# Patient Record
Sex: FEMALE | Race: BLACK OR AFRICAN AMERICAN | NOT HISPANIC OR LATINO | Employment: FULL TIME | URBAN - METROPOLITAN AREA
[De-identification: names, ages, dates, MRNs, and addresses within clinical notes are randomized per-mention and may not be internally consistent; named-entity substitution may affect disease eponyms.]

---

## 2019-11-07 LAB
ABORH_EXT (HISTORICAL CONVERSION): NORMAL
ANTIBODY_EXT (HISTORICAL CONVERSION): NEGATIVE
HBSAG_EXT (HISTORICAL CONVERSION): NEGATIVE
HGB_EXT (HISTORICAL CONVERSION): 11.9
HIV_EXT: NEGATIVE
RPR - HISTORICAL: NORMAL
RUBELLA_EXT (HISTORICAL CONVERSION): NORMAL

## 2020-02-24 ENCOUNTER — HOSPITAL ENCOUNTER (OUTPATIENT)
Dept: MEDSURG UNIT | Facility: CLINIC | Age: 30
Discharge: HOME | End: 2020-02-24
Attending: OBSTETRICS & GYNECOLOGY | Admitting: OBSTETRICS & GYNECOLOGY

## 2020-02-24 LAB
ALBUMIN UR-MCNC: ABNORMAL MG/DL
APPEARANCE UR: ABNORMAL
BACTERIA #/AREA URNS HPF: ABNORMAL HPF
BILIRUB UR QL STRIP: NEGATIVE
CLUE CELLS: NORMAL
COLOR UR AUTO: YELLOW
FIBRONECTIN FETAL VAG QL: NEGATIVE
GLUCOSE UR STRIP-MCNC: NEGATIVE MG/DL
HGB UR QL STRIP: NEGATIVE
KETONES UR STRIP-MCNC: NEGATIVE MG/DL
LEUKOCYTE ESTERASE UR QL STRIP: ABNORMAL
MUCOUS THREADS #/AREA URNS LPF: ABNORMAL LPF
NITRATE UR QL: NEGATIVE
PH UR STRIP: 6 [PH] (ref 4.5–8)
RBC #/AREA URNS AUTO: ABNORMAL HPF
RUPTURE OF FETAL MEMBRANES BY ROM PLUS: NEGATIVE
SP GR UR STRIP: 1.01 (ref 1–1.03)
SQUAMOUS #/AREA URNS AUTO: >100 LPF
TRICHOMONAS, WET PREP: NORMAL
UROBILINOGEN UR STRIP-ACNC: ABNORMAL
WBC #/AREA URNS AUTO: ABNORMAL HPF
YEAST, WET PREP: NORMAL

## 2020-02-24 ASSESSMENT — MIFFLIN-ST. JEOR: SCORE: 1574.98

## 2020-02-25 LAB
ALLERGIC TO PENICILLIN: NORMAL
BACTERIA SPEC CULT: NO GROWTH
GP B STREP DNA SPEC QL NAA+PROBE: NEGATIVE

## 2020-04-01 ENCOUNTER — HOSPITAL ENCOUNTER (OUTPATIENT)
Dept: MEDSURG UNIT | Facility: CLINIC | Age: 30
Discharge: HOME | End: 2020-04-01
Attending: OBSTETRICS & GYNECOLOGY | Admitting: OBSTETRICS & GYNECOLOGY

## 2020-04-01 LAB
ALBUMIN UR-MCNC: NEGATIVE MG/DL
APPEARANCE UR: CLEAR
BILIRUB UR QL STRIP: NEGATIVE
COLOR UR AUTO: NORMAL
GLUCOSE UR STRIP-MCNC: NEGATIVE MG/DL
HGB UR QL STRIP: NEGATIVE
KETONES UR STRIP-MCNC: NEGATIVE MG/DL
LEUKOCYTE ESTERASE UR QL STRIP: NEGATIVE
NITRATE UR QL: NEGATIVE
PH UR STRIP: 8 [PH] (ref 4.5–8)
SP GR UR STRIP: 1.01 (ref 1–1.03)
UROBILINOGEN UR STRIP-ACNC: NORMAL

## 2020-04-06 ENCOUNTER — HOSPITAL ENCOUNTER (OUTPATIENT)
Dept: OBGYN | Facility: HOSPITAL | Age: 30
Discharge: HOME | End: 2020-04-06
Attending: OBSTETRICS & GYNECOLOGY | Admitting: OBSTETRICS & GYNECOLOGY

## 2020-04-06 DIAGNOSIS — N76.0 BACTERIAL VAGINITIS: ICD-10-CM

## 2020-04-06 DIAGNOSIS — B96.89 BACTERIAL VAGINITIS: ICD-10-CM

## 2020-04-06 LAB
ALBUMIN UR-MCNC: ABNORMAL MG/DL
AMORPH CRY #/AREA URNS HPF: ABNORMAL /[HPF]
APPEARANCE UR: ABNORMAL
BACTERIA #/AREA URNS HPF: ABNORMAL HPF
BILIRUB UR QL STRIP: NEGATIVE
CLUE CELLS: ABNORMAL
COLOR UR AUTO: YELLOW
GLUCOSE UR STRIP-MCNC: NEGATIVE MG/DL
HGB UR QL STRIP: NEGATIVE
KETONES UR STRIP-MCNC: NEGATIVE MG/DL
LEUKOCYTE ESTERASE UR QL STRIP: ABNORMAL
MUCOUS THREADS #/AREA URNS LPF: ABNORMAL LPF
NITRATE UR QL: NEGATIVE
PH UR STRIP: 7 [PH] (ref 4.5–8)
RBC #/AREA URNS AUTO: ABNORMAL HPF
RUPTURE OF FETAL MEMBRANES BY ROM PLUS: NEGATIVE
SP GR UR STRIP: 1.02 (ref 1–1.03)
SQUAMOUS #/AREA URNS AUTO: >100 LPF
TRICHOMONAS, WET PREP: ABNORMAL
UROBILINOGEN UR STRIP-ACNC: ABNORMAL
WBC #/AREA URNS AUTO: ABNORMAL HPF
YEAST, WET PREP: ABNORMAL

## 2020-04-06 ASSESSMENT — MIFFLIN-ST. JEOR: SCORE: 1561.37

## 2020-04-07 LAB — BACTERIA SPEC CULT: NO GROWTH

## 2020-04-20 ENCOUNTER — HOSPITAL ENCOUNTER (OUTPATIENT)
Dept: MEDSURG UNIT | Facility: CLINIC | Age: 30
Discharge: HOME | End: 2020-04-20
Attending: OBSTETRICS & GYNECOLOGY | Admitting: OBSTETRICS & GYNECOLOGY

## 2020-04-20 LAB
CLUE CELLS: ABNORMAL
TRICHOMONAS, WET PREP: ABNORMAL
YEAST, WET PREP: ABNORMAL

## 2020-04-28 ENCOUNTER — HOSPITAL ENCOUNTER (OUTPATIENT)
Dept: MEDSURG UNIT | Facility: CLINIC | Age: 30
Discharge: HOME | End: 2020-04-28
Attending: OBSTETRICS & GYNECOLOGY | Admitting: OBSTETRICS & GYNECOLOGY

## 2020-04-28 LAB
CLUE CELLS: ABNORMAL
TRICHOMONAS, WET PREP: ABNORMAL
YEAST, WET PREP: ABNORMAL

## 2020-04-28 ASSESSMENT — MIFFLIN-ST. JEOR: SCORE: 1622.61

## 2020-04-29 ENCOUNTER — HOSPITAL ENCOUNTER (OUTPATIENT)
Dept: MEDSURG UNIT | Facility: CLINIC | Age: 30
Discharge: HOME | End: 2020-04-29
Attending: OBSTETRICS & GYNECOLOGY | Admitting: OBSTETRICS & GYNECOLOGY

## 2020-04-29 LAB
AMPHETAMINES UR QL SCN: NORMAL
BARBITURATES UR QL: NORMAL
BENZODIAZ UR QL: NORMAL
CANNABINOIDS UR QL SCN: NORMAL
COCAINE UR QL: NORMAL
CREAT UR-MCNC: 139.4 MG/DL
METHADONE UR QL SCN: NORMAL
OPIATES UR QL SCN: NORMAL
OXYCODONE UR QL: NORMAL
PCP UR QL SCN: NORMAL
RUPTURE OF FETAL MEMBRANES BY ROM PLUS: NEGATIVE

## 2020-04-29 ASSESSMENT — MIFFLIN-ST. JEOR: SCORE: 1622.61

## 2020-05-04 ENCOUNTER — HOSPITAL ENCOUNTER (OUTPATIENT)
Dept: OBGYN | Facility: HOSPITAL | Age: 30
Discharge: HOME | End: 2020-05-04
Attending: OBSTETRICS & GYNECOLOGY | Admitting: OBSTETRICS & GYNECOLOGY

## 2020-05-04 ENCOUNTER — COMMUNICATION - HEALTHEAST (OUTPATIENT)
Dept: OBGYN | Facility: HOSPITAL | Age: 30
End: 2020-05-04

## 2020-05-04 DIAGNOSIS — Z3A.38 38 WEEKS GESTATION OF PREGNANCY: ICD-10-CM

## 2020-05-04 LAB
SARS-COV-2 PCR COMMENT: NORMAL
SARS-COV-2 RNA SPEC QL NAA+PROBE: NEGATIVE
SARS-COV-2 VIRUS SPECIMEN SOURCE: NORMAL

## 2020-05-04 ASSESSMENT — MIFFLIN-ST. JEOR: SCORE: 1622.61

## 2020-05-08 ENCOUNTER — AMBULATORY - HEALTHEAST (OUTPATIENT)
Dept: OBGYN | Facility: CLINIC | Age: 30
End: 2020-05-08

## 2020-05-08 DIAGNOSIS — Z33.1 PREGNANT STATE, INCIDENTAL: ICD-10-CM

## 2020-05-09 ENCOUNTER — COMMUNICATION - HEALTHEAST (OUTPATIENT)
Dept: FAMILY MEDICINE | Facility: CLINIC | Age: 30
End: 2020-05-09

## 2020-05-09 ENCOUNTER — OFFICE VISIT - HEALTHEAST (OUTPATIENT)
Dept: FAMILY MEDICINE | Facility: CLINIC | Age: 30
End: 2020-05-09

## 2020-05-09 DIAGNOSIS — Z33.1 PREGNANT STATE, INCIDENTAL: ICD-10-CM

## 2020-05-12 ENCOUNTER — ANESTHESIA - HEALTHEAST (OUTPATIENT)
Dept: OBGYN | Facility: CLINIC | Age: 30
End: 2020-05-12

## 2020-05-13 ENCOUNTER — HOME CARE/HOSPICE - HEALTHEAST (OUTPATIENT)
Dept: HOME HEALTH SERVICES | Facility: HOME HEALTH | Age: 30
End: 2020-05-13

## 2020-05-15 ENCOUNTER — HOME CARE/HOSPICE - HEALTHEAST (OUTPATIENT)
Dept: HOME HEALTH SERVICES | Facility: HOME HEALTH | Age: 30
End: 2020-05-15

## 2020-10-26 ENCOUNTER — COMMUNICATION - HEALTHEAST (OUTPATIENT)
Dept: FAMILY MEDICINE | Facility: CLINIC | Age: 30
End: 2020-10-26

## 2020-10-26 ENCOUNTER — OFFICE VISIT - HEALTHEAST (OUTPATIENT)
Dept: FAMILY MEDICINE | Facility: CLINIC | Age: 30
End: 2020-10-26

## 2020-10-26 DIAGNOSIS — Z01.818 PREOP EXAMINATION: ICD-10-CM

## 2020-10-26 DIAGNOSIS — H50.9 STRABISMUS: ICD-10-CM

## 2020-10-26 LAB
ERYTHROCYTE [DISTWIDTH] IN BLOOD BY AUTOMATED COUNT: 12.9 % (ref 11–14.5)
HCT VFR BLD AUTO: 43.7 % (ref 35–47)
HGB BLD-MCNC: 14.2 G/DL (ref 12–16)
MCH RBC QN AUTO: 28.3 PG (ref 27–34)
MCHC RBC AUTO-ENTMCNC: 32.4 G/DL (ref 32–36)
MCV RBC AUTO: 87 FL (ref 80–100)
PLATELET # BLD AUTO: 363 THOU/UL (ref 140–440)
PMV BLD AUTO: 7.4 FL (ref 7–10)
RBC # BLD AUTO: 5.02 MILL/UL (ref 3.8–5.4)
WBC: 11 THOU/UL (ref 4–11)

## 2021-01-15 ENCOUNTER — HEALTH MAINTENANCE LETTER (OUTPATIENT)
Age: 31
End: 2021-01-15

## 2021-05-27 VITALS
TEMPERATURE: 98.3 F | RESPIRATION RATE: 16 BRPM | SYSTOLIC BLOOD PRESSURE: 100 MMHG | HEART RATE: 84 BPM | OXYGEN SATURATION: 98 % | DIASTOLIC BLOOD PRESSURE: 70 MMHG

## 2021-06-04 VITALS — BODY MASS INDEX: 33.49 KG/M2 | HEIGHT: 65 IN | WEIGHT: 201 LBS

## 2021-06-04 VITALS — WEIGHT: 201 LBS | HEIGHT: 65 IN | BODY MASS INDEX: 33.49 KG/M2

## 2021-06-04 VITALS — HEIGHT: 65 IN | BODY MASS INDEX: 33.49 KG/M2 | WEIGHT: 201 LBS

## 2021-06-04 VITALS — WEIGHT: 191 LBS | HEIGHT: 64 IN | BODY MASS INDEX: 32.61 KG/M2

## 2021-06-04 VITALS — BODY MASS INDEX: 33.12 KG/M2 | HEIGHT: 64 IN | WEIGHT: 194 LBS

## 2021-06-05 VITALS
SYSTOLIC BLOOD PRESSURE: 106 MMHG | DIASTOLIC BLOOD PRESSURE: 74 MMHG | BODY MASS INDEX: 31.7 KG/M2 | WEIGHT: 190.5 LBS | HEART RATE: 91 BPM

## 2021-06-06 NOTE — PROGRESS NOTES
Labs WNL, urine sent for culture. OB US WNL. Pt is constipated, MD attempted digital extraction of stool with small amount of results. Order to d/c pt home, to follow up in clinic this week. Pt states understanding of plan of care.

## 2021-06-07 NOTE — PROGRESS NOTES
Pt seen in triage today for rule out rupture.  ROM + negative.  Cervical exam by Dr. Fierro is unchanged from yesterdays exam.  Pt educated on hydration and reminded to  her Rx to treat her bacterial vaginosis.

## 2021-06-07 NOTE — PROGRESS NOTES
"Chief Complaint   Patient presents with     Consult     Abdominal pain       Initial /57 (BP Location: Right arm, Patient Position: Sitting, Cuff Size: Adult Regular)  Pulse 67  Ht 5' 0.2\" (152.9 cm)  Wt 135 lb 5.8 oz (61.4 kg)  BMI 26.26 kg/m2 Estimated body mass index is 26.26 kg/(m^2) as calculated from the following:    Height as of this encounter: 5' 0.2\" (152.9 cm).    Weight as of this encounter: 135 lb 5.8 oz (61.4 kg).  Medication Reconciliation: complete    " Pt arrives to OB unit to rule out labor. She reports back pain and cramping today. Denies leaking of fluid or any bleeding at this time. She states she has had bacterial vaginosis twice this month and has finished treatment. Wet prep obtained along with cervical exam.

## 2021-06-07 NOTE — PROGRESS NOTES
"OB Triage    Subjective: Castro Kurtz is a  29 y.o. female at 37w2d with a prenatal history significant for tobacco use who presents to OB triage with leakage of fluid. Patient was here overnight in triage for worsening abdominal cramping, wet prep was positive for BV and patient treated with flagyl. Cervical exam performed as well. Took one dose while here prior to discharge and has not picked up prescription. She reports around 0100hrs last night, she went to the bathroom and noticed a brownish colored vaginal discharge. Also, reports she has had increased intensity of contractions and they occur irregularly throughout the day. She states she had five contractions, around 45 seconds each in a 1-2 hour period and then they went away for awhile. She had a phone visit with Dr. Duckworth this morning who counseled patient to come in to be seen. She describes her pain in her lower abdomen, worse on the left lower quadrant. She has been wearing a \"belly band.\" She has only been drinking about two bottles of water a day. Also reports a couple episodes of diarrhea over the past week or two.Denies headache, vision changes, RUQ pain, extremity swelling, nausea/vomiting, vaginal bleeding.     Estimated Date of Delivery: 20 No LMP recorded. Patient is pregnant.  OB provider: Hollis Duckworth MD  OB History        4    Para   2    Term   2            AB   1    Living   2       SAB        TAB        Ectopic        Multiple        Live Births                     Objective:   Blood pressure 113/75, pulse 91, temperature 98  F (36.7  C), temperature source Oral, resp. rate 16, height 5' 5\" (1.651 m), weight 201 lb (91.2 kg).  General:   alert, appears stated age and cooperative   Skin:   normal   Lungs:   clear to auscultation bilaterally   Heart:   regular rate and rhythm, S1, S2 normal, no murmur, click, rub or gallop   Extremities: Warm, dry and well perfused. No edema.   Neuro: Reflexes 2+ and " symmetric.    Abdomen: fundus soft, nontender 37 weeks size and FHT present   Membranes intact   [place]:  Occasional, irregular   FHT: Baseline: 145 bpm, Variability: Moderate (6 - 25 bpm), Accelerations: Present (reactive NST) and Decelerations: Absent. Category I Tracing   Cervix: Examined by Dr. Fierro    Dilation: Fingertip   Effacement: 25%   Station:  -3   Consistency: medium   Position: middle     Laboratory Studies:   Results for orders placed or performed during the hospital encounter of 20   Collect and send wet prep vaginal specimen as indicated by prenatal history and/or patient complaints    Specimen: Vaginal; Genital   Result Value Ref Range    Yeast Result No yeast seen No yeast seen    Trichomonas No Trichomonas seen No Trichomonas seen    Clue Cells, Wet Prep Clue cells seen (!) No Clue cells seen        ASSESSMENT AND PLAN: Castro Kurtz is a  29 y.o. female with a prenatal history significant for tobacco use who presented to [facility] at 37w2d on 2020 with an episode of brownish vaginal discharge shortly after returning home from hospital. Recently diagnosed with bacterial vaginosis last night and cervical exam performed. Having intermittent, worsening lower abdominal cramping. Has only taken one dose of flagyl since last night. ROM negative test. Reactive NST. Cervical exam unchanged. Likely, experienced vaginal discharge from cervical exam last night. Also, may still have increased abdominal cramping/contractions from bacterial vaginosis versus round ligament pain versus dehydration. Counseled on importance to stay hydrated and take full course of antibiotics. Counseled on signs and symptoms to return/call including: vaginal bleeding, increased frequency/intensity of contractions, etc.   1. Not in labor.  2.  flagyl prescription and take two times a day for 7 days.  3. Aim for at least 64 oz of water per day or more  4. Follow up in clinic in the  next few days.     Patient discussed with attending physician, Dr. Juanis Norman, who agrees with the plan.      Luzma Fierro MD PGY2 4/29/2020  [facility]

## 2021-06-07 NOTE — PROGRESS NOTES
"Pt reports that after fluid bolus and IV zofran that she is feeling \"much better\" and no longer is having abdominal cramping. Says that she would feel comfortable discharging to home. Dr Murillo updated on pt status and lab results. Will give one dose of flagyl now to treat BV and  will call in Rx to her pharmacy for the remaining doses. Pt given written and verbal instructions and discharged to home  "

## 2021-06-07 NOTE — PROGRESS NOTES
Pt given DC instructions. She verbalized understanding and had no further questions or concerns. She had her belongings in her possession and ambulated upon dc from unit.

## 2021-06-07 NOTE — PROGRESS NOTES
"Pt arrived with c/o lower uterine pressure and cramping that radiates into her sides. She reports that she has had \"dampness\" in her underwear since Saturday and some frequency and urgency with voiding, denies further complications. ROM+, wet prep and UA specs obtained. FHT's category one some uterine irritability noted on the monitor.  BV present other tests negative. Dr Virgen notified of the above, RX sent with pt.  "

## 2021-06-07 NOTE — PROGRESS NOTES
Pt arrives to OB unit with c/o nausea, diarrhea that began yesterday. She reports abdominal cramping this evening. Reports that she was not able to eat or drink much today due to the nausea.

## 2021-06-07 NOTE — PROGRESS NOTES
Pt arrived through the ED for labor that started around 0300. She appears uncomfortable, Monitors applied, SVE 1 cm. Covid test collected and sent

## 2021-06-07 NOTE — PROGRESS NOTES
Dr Murillo notified of pt status and lab results. ORders to treat with flagyl and send home with Rx for flagyl to treat BV diagnosis. Pt given discharge instructions in written and verbal form. Pt instructed to touch base with clinic tomorrow.

## 2021-06-07 NOTE — PROGRESS NOTES
"Pt presents to unit with c/o \"I tripped and fell yesterday and I just woke up to some sharp pain,\" pt points to her low, left side of abdomen/groin. Pt also states that she has not felt the baby move since yesterday morning. Pt denies any vaginal bleeding or discharge. Abdomen is soft and nontender to palpation. Pt placed on EFM. VS taken. FM+ with leopolds. Pt also reports increased urinary frequency and retention.   Dr Chaidez called and updated, went to bedside to evaluate pt. Plan to send UA and EFM while awaiting results. Pt verbalized agreement with plan.  "

## 2021-06-08 NOTE — ANESTHESIA PREPROCEDURE EVALUATION
Anesthesia Evaluation      Patient summary reviewed     Airway    Pulmonary    (+) a smoker                         Cardiovascular    Neuro/Psych      Endo/Other    (+) obesity, pregnant     GI/Hepatic/Renal            Dental                         Anesthesia Plan  Planned anesthetic: epidural    ASA 2

## 2021-06-08 NOTE — ANESTHESIA PROCEDURE NOTES
Epidural Block    Patient location during procedure: OB  Time Called: 5/12/2020 11:30 AM  Reason for Block:labor epidural  Staffing:  Performing  Anesthesiologist: Stuart Boateng MD  Preanesthetic Checklist  Completed: patient identified, risks, benefits, and alternatives discussed, timeout performed, consent obtained, at patient's request, airway assessed, oxygen available, suction available, emergency drugs available and hand hygiene performed  Procedure  Patient position: sitting  Prep: ChloraPrep  Patient monitoring: continuous pulse oximetry, heart rate and blood pressure  Approach: midline  Location: L2-L3  Injection technique: ASAD saline  Number of Attempts:1  Needle  Needle type: [employer]   Needle gauge: 18 G     Catheter in Space: 4  Assessment  Sensory level:  No complications      Additional Notes:  After negative aspirate for heme/CSF, a test dose was given using three ml 1.5% xylocaine with epinephrine 1:200K.  Test dose was negative.  Catheter was taped securely.  Epidural catheter was then dosed with 8ml 0.2% ropivacaine.  Epidural infusion initiated by OB RN as per orders.  Procedure was well tolerated.

## 2021-06-08 NOTE — ANESTHESIA POSTPROCEDURE EVALUATION
Patient: Castro Kurtz  * No procedures listed *  Anesthesia type: epidural    Patient location: Labor and Delivery  Last vitals: No vitals data found for the desired time range.    Post vital signs: stable  Level of consciousness: awake and responds to simple questions  Post-anesthesia pain: pain controlled  Post-anesthesia nausea and vomiting: no  Pulmonary: unassisted, return to baseline  Cardiovascular: stable and blood pressure at baseline  Hydration: adequate  Anesthetic events: no    QCDR Measures:  ASA# 11 - Tiffany-op Cardiac Arrest: ASA11B - Patient did NOT experience unanticipated cardiac arrest  ASA# 12 - Tiffany-op Mortality Rate: ASA12B - Patient did NOT die  ASA# 13 - PACU Re-Intubation Rate: NA - No ETT / LMA used for case  ASA# 10 - Composite Anes Safety: ASA10A - No serious adverse event    Additional Notes:

## 2021-06-12 NOTE — PROGRESS NOTES
Preoperative Exam    Scheduled Procedure: L eye surgery, strabismus  Surgery Date:  10/28/2020  Surgery Location: [facility]     Surgeon:  Unknown     Assessment/Plan:     1. Preop examination  For left eye strabismus surgery.  - HM2(CBC w/o Differential)    2. Strabismus  Left eye affected with decreased visual acuity.  - HM2(CBC w/o Differential)    PLAN:  1.  CBC reviewed, results completely normal see below.  2.  Patient is otherwise cleared for surgery.        Surgical Procedure Risk: Low (reported cardiac risk generally < 1%)  Have you had prior anesthesia?: No  Have you or any family members had a previous anesthesia reaction:  No  Do you or any family members have a history of a clotting or bleeding disorder?: No  Cardiac Risk Assessment: no increased risk for major cardiac complications    APPROVAL GIVEN to proceed with proposed procedure, without further diagnostic evaluation    Please Note:  Patient has no prior surgical history.    Functional Status: Independent  Patient plans to recover at home with family.     Subjective:      Castro Kurtz is a 30 y.o. female who presents for a preoperative consultation.  Patient comes in for preoperative clearance for left eye surgery specifically she has a lazy eye or strabismus, this has been a longstanding issue, she has minimal visual acuity of the left eye fairly good on the right, it is hoped that she gets some improvement in vision of the left eye after surgery.    The patient is otherwise very healthy no other change in her health status she is not on any regular medications.  She does have a Mirena IUD in, this was placed 6 weeks postpartum she did have a vaginal delivery 6 months ago.    Patient has no underlying health issues, no history of any asthma or breathing problems no recent illnesses.  She is a very light smoker several cigarettes daily otherwise has good health habits.    I reviewed the patient's allergies medications active  medical problems past medical history past surgical history family history and social history.    All other systems reviewed and are negative, other than those listed in the HPI.    Pertinent History  Do you have difficulty breathing or chest pain after walking up a flight of stairs: No  History of obstructive sleep apnea: No  Steroid use in the last 6 months: No  Frequent Aspirin/NSAID use: No  Prior Blood Transfusion: No  Prior Blood Transfusion Reaction: No  If for some reason prior to, during or after the procedure, if it is medically indicated, would you be willing to have a blood transfusion?:  There is no transfusion refusal.    No current outpatient medications on file.     No current facility-administered medications for this visit.         No Known Allergies    Patient Active Problem List   Diagnosis     Papanicolaou smear of cervix with atypical squamous cells of undetermined significance (ASC-US)     Uses birth control       History reviewed. No pertinent past medical history.    Past Surgical History:   Procedure Laterality Date     INTRAUTERINE DEVICE INSERTION      Mirena       Social History     Socioeconomic History     Marital status: Single     Spouse name: Not on file     Number of children: 3     Years of education: Not on file     Highest education level: Not on file   Occupational History     Occupation: PCA   Social Needs     Financial resource strain: Not on file     Food insecurity     Worry: Not on file     Inability: Not on file     Transportation needs     Medical: Not on file     Non-medical: Not on file   Tobacco Use     Smoking status: Current Every Day Smoker     Packs/day: 0.25     Years: 5.00     Pack years: 1.25     Smokeless tobacco: Never Used     Tobacco comment: 2-3 cigarettes a day   Substance and Sexual Activity     Alcohol use: Yes     Comment: rare     Drug use: Never     Sexual activity: Not Currently     Partners: Male     Birth control/protection: None   Lifestyle      Physical activity     Days per week: Not on file     Minutes per session: Not on file     Stress: Not on file   Relationships     Social connections     Talks on phone: Not on file     Gets together: Not on file     Attends [employer] service: Not on file     Active member of club or organization: Not on file     Attends meetings of clubs or organizations: Not on file     Relationship status: Not on file     Intimate partner violence     Fear of current or ex partner: Not on file     Emotionally abused: Not on file     Physically abused: Not on file     Forced sexual activity: Not on file   Other Topics Concern     Not on file   Social History Narrative     Not on file       Patient Care Team:  Hollis Duckworth MD as PCP - General (Obstetrics and Gynecology)          Objective:     Vitals:    10/26/20 1134   BP: 106/74   Pulse: 91   Weight: 190 lb 8 oz (86.4 kg)         Physical Exam:  Physical Exam   Constitutional: She is oriented to person, place, and time. She appears well-developed and well-nourished.   HENT:   Head: Normocephalic and atraumatic.   Right Ear: External ear normal.   Eyes: Pupils are equal, round, and reactive to light. Conjunctivae and EOM are normal.   Cardiovascular: Normal rate, regular rhythm and normal heart sounds.   Pulmonary/Chest: Effort normal and breath sounds normal.   Musculoskeletal: Normal range of motion.   Neurological: She is alert and oriented to person, place, and time.   Skin: Skin is warm and dry.   Psychiatric: She has a normal mood and affect. Her behavior is normal. Judgment and thought content normal.         There are no Patient Instructions on file for this visit.    EKG: Not performed    Labs:  Recent Results (from the past 24 hour(s))   HM2(CBC w/o Differential)    Collection Time: 10/26/20 12:02 PM   Result Value Ref Range    WBC 11.0 4.0 - 11.0 thou/uL    RBC 5.02 3.80 - 5.40 mill/uL    Hemoglobin 14.2 12.0 - 16.0 g/dL    Hematocrit 43.7 35.0 - 47.0 %    MCV 87 80 -  100 fL    MCH 28.3 27.0 - 34.0 pg    MCHC 32.4 32.0 - 36.0 g/dL    RDW 12.9 11.0 - 14.5 %    Platelets 363 140 - 440 thou/uL    MPV 7.4 7.0 - 10.0 fL       Immunization History   Administered Date(s) Administered     DTP 06/30/1989, 09/22/1989, 10/25/1994, 01/17/1995, 03/23/1995     HIB (HBOC) 01/17/1995, 03/23/1995     Hep B, Peds or Adolescent 01/17/1995, 03/23/1995, 09/11/2001, 10/09/2001     MMR 10/25/1994, 09/11/2001     OPV,Trivalent,Historic(2151-6912 only) 06/30/1989, 10/25/1994, 01/17/1995, 03/23/1995     POLIO, Unspecified 06/30/1989, 10/25/1994, 01/17/1995, 03/23/1995     Tdap 03/10/2008           Electronically signed by Alec Prince MD 10/26/20 11:29 AM

## 2021-06-20 NOTE — LETTER
Letter by Elzbieta Hassan RN at      Author: Elzbieta Hassan RN Service: -- Author Type: --    Filed:  Encounter Date: 5/4/2020 Status: (Other)       5/4/2020        Casrto Kurtz  [address] [place] [address]    This letter provides a written record that you were tested for COVID-19 on 5/4/20.     Your result was negative.    This means that we didnt find the virus that causes COVID-19 in your sample. A test may show negative when you do actually have the virus. This can happen when the virus is in the early stages of infection, before you feel illness symptoms.    Even if you dont have symptoms, they may still appear. For safety, its very important to follow these rules.    Keep yourself away from others (self-isolation):      Stay home. Dont go to work, school or anywhere else.     Stay away from others in your home. No hugging, kissing or shaking hands.    Dont let anyone visit.    Cover your mouth and nose with a mask, tissue or wash cloth to avoid spreading germs.    Stay in self-isolation until you meet ALL of the guidelines below:    1. You have had no fever for at least 72 hours (that is 3 full days of no fever without the use of medicine that reduces fevers), AND  2. other symptoms (such as cough, shortness of breath) have gotten better, AND  3. at least 7 days have passed since your symptoms first appeared.        For questions regarding this letter or your Negative COVID-19 result, call [phone] between 8A to 6:30P (M-F) and 10A to 6:30P (weekends).

## 2021-06-20 NOTE — LETTER
Letter by Elzbieta Hassan RN at      Author: Elzbieta Hassan RN Service: -- Author Type: --    Filed:  Encounter Date: 5/9/2020 Status: (Other)       5/9/2020        Castro Kurtz  [address] [place] [address]    This letter provides a written record that you were tested for COVID-19 on 5/9/20.     Your result was negative.    This means that we didnt find the virus that causes COVID-19 in your sample. A test may show negative when you do actually have the virus. This can happen when the virus is in the early stages of infection, before you feel illness symptoms.    Even if you dont have symptoms, they may still appear. For safety, its very important to follow these rules.    Keep yourself away from others (self-isolation):      Stay home. Dont go to work, school or anywhere else.     Stay in your own room (and use your own bathroom), if you can.    Stay away from others in your home. No hugging, kissing or shaking hands. No visitors.    Clean high touch surfaces often (doorknobs, counters, handles, etc.). Use a household cleaning spray or wipes.    Cover your mouth and nose with a mask, tissue or washcloth to avoid spreading germs.    Wash your hands and face often with soap and water.    Stay in self-isolation until you meet ALL of the guidelines below:    1. You have had no fever for at least 72 hours (that is 3 full days of no fever without the use of medicine that reduces fevers), AND  2. other symptoms (such as cough, shortness of breath) have gotten better, AND  3. at least 10 days have passed since your symptoms first appeared.    Going back to work  Check with your employer for any guidelines to follow for going back to work.    Employers: This document serves as formal notice that your employee tested negative for COVID-19, as of the testing date shown above.    For questions regarding this letter or your Negative COVID-19 result, call [phone] between 8A to 6:30P (M-F) and 10A to 6:30P  (weekends).

## 2021-06-21 NOTE — LETTER
Letter by Alec Prince MD at      Author: Alec Prince MD Service: -- Author Type: --    Filed:  Encounter Date: 10/26/2020 Status: (Other)         Csatro Kurtz  [address] [place] [address]             October 26, 2020         Dear Ms. Kurtz,    Below are the results from your recent visit: Blood counts are normal, no anemia.    Resulted Orders   HM2(CBC w/o Differential)   Result Value Ref Range    WBC 11.0 4.0 - 11.0 thou/uL    RBC 5.02 3.80 - 5.40 mill/uL    Hemoglobin 14.2 12.0 - 16.0 g/dL    Hematocrit 43.7 35.0 - 47.0 %    MCV 87 80 - 100 fL    MCH 28.3 27.0 - 34.0 pg    MCHC 32.4 32.0 - 36.0 g/dL    RDW 12.9 11.0 - 14.5 %    Platelets 363 140 - 440 thou/uL    MPV 7.4 7.0 - 10.0 fL            Please call with questions or contact us using [employer].    Sincerely,        Electronically signed by Alec Prince MD

## 2021-07-14 PROBLEM — Z34.90 PREGNANT: Status: RESOLVED | Noted: 2020-05-12 | Resolved: 2020-10-26

## 2021-09-11 ENCOUNTER — HEALTH MAINTENANCE LETTER (OUTPATIENT)
Age: 31
End: 2021-09-11

## 2021-10-01 ENCOUNTER — HOSPITAL ENCOUNTER (EMERGENCY)
Facility: CLINIC | Age: 31
Discharge: LEFT BEFORE DISCHARGE | End: 2021-10-01
Admitting: EMERGENCY MEDICINE

## 2021-10-01 VITALS
OXYGEN SATURATION: 99 % | DIASTOLIC BLOOD PRESSURE: 69 MMHG | TEMPERATURE: 98.9 F | RESPIRATION RATE: 20 BRPM | SYSTOLIC BLOOD PRESSURE: 109 MMHG | BODY MASS INDEX: 32.62 KG/M2 | WEIGHT: 196 LBS | HEART RATE: 122 BPM

## 2021-10-01 LAB
ATRIAL RATE - MUSE: 108 BPM
DIASTOLIC BLOOD PRESSURE - MUSE: NORMAL MMHG
INTERPRETATION ECG - MUSE: NORMAL
P AXIS - MUSE: 62 DEGREES
PR INTERVAL - MUSE: 140 MS
QRS DURATION - MUSE: 70 MS
QT - MUSE: 316 MS
QTC - MUSE: 423 MS
R AXIS - MUSE: 65 DEGREES
SYSTOLIC BLOOD PRESSURE - MUSE: NORMAL MMHG
T AXIS - MUSE: 51 DEGREES
VENTRICULAR RATE- MUSE: 108 BPM

## 2021-10-01 PROCEDURE — 93005 ELECTROCARDIOGRAM TRACING: CPT | Performed by: EMERGENCY MEDICINE

## 2021-10-01 PROCEDURE — 999N000104 HC STATISTIC NO CHARGE

## 2021-10-01 NOTE — ED TRIAGE NOTES
Liposuction done on 9/23 in [place].  Now c/o abd pain nonradiating, cp nonradiating. Denies SOB. Denies fevers. Tylenol at 0800. Ibuprofen last night. No N/V

## 2022-02-26 ENCOUNTER — HEALTH MAINTENANCE LETTER (OUTPATIENT)
Age: 32
End: 2022-02-26

## 2022-10-29 ENCOUNTER — HEALTH MAINTENANCE LETTER (OUTPATIENT)
Age: 32
End: 2022-10-29

## 2023-04-08 ENCOUNTER — HEALTH MAINTENANCE LETTER (OUTPATIENT)
Age: 33
End: 2023-04-08

## 2023-09-03 ENCOUNTER — APPOINTMENT (OUTPATIENT)
Dept: CT IMAGING | Facility: CLINIC | Age: 33
End: 2023-09-03
Attending: SOCIAL WORKER
Payer: COMMERCIAL

## 2023-09-03 ENCOUNTER — HOSPITAL ENCOUNTER (EMERGENCY)
Facility: CLINIC | Age: 33
Discharge: HOME | End: 2023-09-03
Attending: SOCIAL WORKER | Admitting: SOCIAL WORKER
Payer: COMMERCIAL

## 2023-09-03 VITALS
OXYGEN SATURATION: 99 % | DIASTOLIC BLOOD PRESSURE: 65 MMHG | WEIGHT: 205 LBS | SYSTOLIC BLOOD PRESSURE: 99 MMHG | TEMPERATURE: 98.7 F | HEIGHT: 65 IN | HEART RATE: 68 BPM | BODY MASS INDEX: 34.16 KG/M2 | RESPIRATION RATE: 18 BRPM

## 2023-09-03 DIAGNOSIS — J02.0 STREP PHARYNGITIS: ICD-10-CM

## 2023-09-03 DIAGNOSIS — J03.90 TONSILLITIS: ICD-10-CM

## 2023-09-03 DIAGNOSIS — J36 ABSCESS, INTRATONSILLAR: ICD-10-CM

## 2023-09-03 LAB
ANION GAP SERPL CALCULATED.3IONS-SCNC: 11 MMOL/L (ref 7–15)
BASOPHILS # BLD AUTO: 0 10E3/UL (ref 0–0.2)
BASOPHILS NFR BLD AUTO: 0 %
BUN SERPL-MCNC: 6.9 MG/DL (ref 6–20)
CALCIUM SERPL-MCNC: 9.4 MG/DL (ref 8.6–10)
CHLORIDE SERPL-SCNC: 102 MMOL/L (ref 98–107)
CREAT SERPL-MCNC: 0.68 MG/DL (ref 0.51–0.95)
DEPRECATED HCO3 PLAS-SCNC: 25 MMOL/L (ref 22–29)
EOSINOPHIL # BLD AUTO: 0 10E3/UL (ref 0–0.7)
EOSINOPHIL NFR BLD AUTO: 0 %
ERYTHROCYTE [DISTWIDTH] IN BLOOD BY AUTOMATED COUNT: 13.2 % (ref 10–15)
GFR SERPL CREATININE-BSD FRML MDRD: >90 ML/MIN/1.73M2
GLUCOSE SERPL-MCNC: 98 MG/DL (ref 70–99)
GROUP A STREP BY PCR: DETECTED
HCT VFR BLD AUTO: 39.1 % (ref 35–47)
HGB BLD-MCNC: 13.1 G/DL (ref 11.7–15.7)
IMM GRANULOCYTES # BLD: 0 10E3/UL
IMM GRANULOCYTES NFR BLD: 0 %
LYMPHOCYTES # BLD AUTO: 2.1 10E3/UL (ref 0.8–5.3)
LYMPHOCYTES NFR BLD AUTO: 13 %
MCH RBC QN AUTO: 29.2 PG (ref 26.5–33)
MCHC RBC AUTO-ENTMCNC: 33.5 G/DL (ref 31.5–36.5)
MCV RBC AUTO: 87 FL (ref 78–100)
MONOCYTES # BLD AUTO: 1.1 10E3/UL (ref 0–1.3)
MONOCYTES NFR BLD AUTO: 7 %
NEUTROPHILS # BLD AUTO: 12.3 10E3/UL (ref 1.6–8.3)
NEUTROPHILS NFR BLD AUTO: 80 %
NRBC # BLD AUTO: 0 10E3/UL
NRBC BLD AUTO-RTO: 0 /100
PLATELET # BLD AUTO: 314 10E3/UL (ref 150–450)
POTASSIUM SERPL-SCNC: 3.2 MMOL/L (ref 3.4–5.3)
RBC # BLD AUTO: 4.49 10E6/UL (ref 3.8–5.2)
SODIUM SERPL-SCNC: 138 MMOL/L (ref 136–145)
WBC # BLD AUTO: 15.5 10E3/UL (ref 4–11)

## 2023-09-03 PROCEDURE — 96375 TX/PRO/DX INJ NEW DRUG ADDON: CPT

## 2023-09-03 PROCEDURE — 96365 THER/PROPH/DIAG IV INF INIT: CPT | Mod: 59

## 2023-09-03 PROCEDURE — 85025 COMPLETE CBC W/AUTO DIFF WBC: CPT | Performed by: SOCIAL WORKER

## 2023-09-03 PROCEDURE — 99285 EMERGENCY DEPT VISIT HI MDM: CPT | Mod: 25

## 2023-09-03 PROCEDURE — 36415 COLL VENOUS BLD VENIPUNCTURE: CPT | Performed by: SOCIAL WORKER

## 2023-09-03 PROCEDURE — 96361 HYDRATE IV INFUSION ADD-ON: CPT

## 2023-09-03 PROCEDURE — 258N000003 HC RX IP 258 OP 636: Performed by: SOCIAL WORKER

## 2023-09-03 PROCEDURE — 87651 STREP A DNA AMP PROBE: CPT | Performed by: SOCIAL WORKER

## 2023-09-03 PROCEDURE — 250N000009 HC RX 250: Performed by: SOCIAL WORKER

## 2023-09-03 PROCEDURE — 250N000011 HC RX IP 250 OP 636: Mod: JZ | Performed by: SOCIAL WORKER

## 2023-09-03 PROCEDURE — 80048 BASIC METABOLIC PNL TOTAL CA: CPT | Performed by: SOCIAL WORKER

## 2023-09-03 PROCEDURE — 250N000011 HC RX IP 250 OP 636: Performed by: SOCIAL WORKER

## 2023-09-03 PROCEDURE — 70491 CT SOFT TISSUE NECK W/DYE: CPT

## 2023-09-03 PROCEDURE — 250N000013 HC RX MED GY IP 250 OP 250 PS 637: Performed by: SOCIAL WORKER

## 2023-09-03 RX ORDER — DEXAMETHASONE SODIUM PHOSPHATE 10 MG/ML
10 INJECTION, SOLUTION INTRAMUSCULAR; INTRAVENOUS ONCE
Status: COMPLETED | OUTPATIENT
Start: 2023-09-03 | End: 2023-09-03

## 2023-09-03 RX ORDER — IBUPROFEN 600 MG/1
600 TABLET, FILM COATED ORAL ONCE
Status: COMPLETED | OUTPATIENT
Start: 2023-09-03 | End: 2023-09-03

## 2023-09-03 RX ORDER — AMPICILLIN AND SULBACTAM 2; 1 G/1; G/1
3 INJECTION, POWDER, FOR SOLUTION INTRAMUSCULAR; INTRAVENOUS ONCE
Status: COMPLETED | OUTPATIENT
Start: 2023-09-03 | End: 2023-09-03

## 2023-09-03 RX ORDER — IOPAMIDOL 755 MG/ML
500 INJECTION, SOLUTION INTRAVASCULAR ONCE
Status: COMPLETED | OUTPATIENT
Start: 2023-09-03 | End: 2023-09-03

## 2023-09-03 RX ORDER — KETOROLAC TROMETHAMINE 15 MG/ML
15 INJECTION, SOLUTION INTRAMUSCULAR; INTRAVENOUS ONCE
Status: COMPLETED | OUTPATIENT
Start: 2023-09-03 | End: 2023-09-03

## 2023-09-03 RX ADMIN — IOPAMIDOL 90 ML: 755 INJECTION, SOLUTION INTRAVENOUS at 03:37

## 2023-09-03 RX ADMIN — DEXAMETHASONE SODIUM PHOSPHATE 10 MG: 10 INJECTION, SOLUTION INTRAMUSCULAR; INTRAVENOUS at 02:55

## 2023-09-03 RX ADMIN — IBUPROFEN 600 MG: 600 TABLET, FILM COATED ORAL at 02:17

## 2023-09-03 RX ADMIN — AMPICILLIN SODIUM AND SULBACTAM SODIUM 3 G: 2; 1 INJECTION, POWDER, FOR SOLUTION INTRAMUSCULAR; INTRAVENOUS at 04:59

## 2023-09-03 RX ADMIN — SODIUM CHLORIDE 65 ML: 9 INJECTION, SOLUTION INTRAVENOUS at 03:37

## 2023-09-03 RX ADMIN — SODIUM CHLORIDE 1000 ML: 9 INJECTION, SOLUTION INTRAVENOUS at 02:55

## 2023-09-03 ASSESSMENT — ACTIVITIES OF DAILY LIVING (ADL)
ADLS_ACUITY_SCORE: 35
ADLS_ACUITY_SCORE: 35

## 2023-09-03 NOTE — DISCHARGE INSTRUCTIONS
You were seen in the emergency department for painful sore throat.  You were found to have strep.  The CT scan here did not show any signs of an abscess that could be drained.  I gave you dose of antibiotics here.  Your pain improved after receiving a dose of steroids and ibuprofen.    At home, please take ibuprofen and Tylenol for your pain.  You can also try gargling with salt water as this may help as well.    I am sending a prescription for a antibiotic called Augmentin to [facility].  Please take this as directed.  Please call your primary care doctor on Tuesday to schedule a follow-up checkup.    If you have high fevers, if you start having severe pain that cannot be managed at home, if you feel like the swelling is worsening, you have difficulty breathing, you are unable to swallow your antibiotics, or other concerning symptoms please come back to the emergency department.

## 2023-09-03 NOTE — ED PROVIDER NOTES
"  History     Chief Complaint:  Facial Swelling       The history is provided by the patient.      Castro Kurtz is a 32 year old female who presents with a sore throat and facial swelling. She explains that for the past 48 hours she has had a sore throat. With her sore throat she notes that it has been hard to swallow. This morning she woke up and noticed that she had some right sided neck swelling. She has no energy, chills, and the sweats as well. When she turns her neck she has some pain on her right side. Denies any diarrhea, emesis, shortness of breath, fevers, and chest pain. She does note that her son had an ear infection and a cold a week ago and daughter had strep 2 weeks ago.     Independent Historian:   None - Patient Only    Review of External Notes:   No relevant external notes    Medications:    Cyclobenzaprine   Gabapentin   Sennosides-docusate sodium     Past Medical History:    H/O alcohol abuse   Tobacco use   Abnormal discharge   Insomnia   Migraine with aura   Depression   Anxiety   Chlamydia   Echogenic focus of heart  Cellulitis     Past Surgical History:    [place] teeth extraction   Insert intrauterine device      Physical Exam   Patient Vitals for the past 24 hrs:   BP Temp Temp src Pulse Resp SpO2 Height Weight   09/03/23 0215 109/80 -- -- -- -- -- -- --   09/03/23 0213 -- 98.6  F (37  C) Temporal 106 20 98 % 1.651 m (5' 5\") 93 kg (205 lb)        Physical Exam  General: Alert, interactive appropriately, overall nontoxic appearing   HEENT: Conjunctivae clear, no scleral icterus, posterior pharynx with bilateral exudates, right-sided tonsillar swelling, no uvular deviation or swelling, swelling over the right side of the neck greater than the left side of the neck no crepitus, floor of the mouth soft  Neuro: Alert, no focal deficit, gait intact   CV: Regular rate and rhythm, no murmurs, pulses equal   Respiratory: No signs of respiratory distress, lungs clear to auscultation bilaterally "   Abdomen: Soft, nontender nondistended   MSK: No rashes lesions skin intact, no crepitus to palpation over the chest or neck    Emergency Department Course     Imaging:  Soft tissue neck CT w contrast   Final Result   IMPRESSION:    1.  Findings suggestive of pharyngitis and tonsillitis, with a small abscess in the right palatine tonsil measuring 6 x 7 x 3 mm in diameter.   2.  Marked cervical adenopathy, without evidence of suppuration.   3.  The airway is patent.               Report per radiology    Laboratory:  Labs Ordered and Resulted from Time of ED Arrival to Time of ED Departure   BASIC METABOLIC PANEL - Abnormal       Result Value    Sodium 138      Potassium 3.2 (*)     Chloride 102      Carbon Dioxide (CO2) 25      Anion Gap 11      Urea Nitrogen 6.9      Creatinine 0.68      Calcium 9.4      Glucose 98      GFR Estimate >90     CBC WITH PLATELETS AND DIFFERENTIAL - Abnormal    WBC Count 15.5 (*)     RBC Count 4.49      Hemoglobin 13.1      Hematocrit 39.1      MCV 87      MCH 29.2      MCHC 33.5      RDW 13.2      Platelet Count 314      % Neutrophils 80      % Lymphocytes 13      % Monocytes 7      % Eosinophils 0      % Basophils 0      % Immature Granulocytes 0      NRBCs per 100 WBC 0      Absolute Neutrophils 12.3 (*)     Absolute Lymphocytes 2.1      Absolute Monocytes 1.1      Absolute Eosinophils 0.0      Absolute Basophils 0.0      Absolute Immature Granulocytes 0.0      Absolute NRBCs 0.0     GROUP A STREPTOCOCCUS PCR THROAT SWAB - Abnormal    Group A strep by PCR Detected (*)       Emergency Department Course & Assessments:       Interventions:  Medications   ibuprofen (ADVIL/MOTRIN) tablet 600 mg (600 mg Oral $Given 9/3/23 0217)   dexAMETHasone PF (DECADRON) injection 10 mg (10 mg Intravenous $Given 9/3/23 0255)   ketorolac (TORADOL) injection 15 mg (0 mg Intravenous Return to Cabinet 9/3/23 0255)   0.9% sodium chloride BOLUS (0 mLs Intravenous Stopped 9/3/23 0421)   iopamidol (ISOVUE-370)  solution 500 mL (90 mLs Intravenous $Given 9/3/23 0337)   Sodium Chloride for CT Scan Flush Use (65 mLs Intravenous $Given 9/3/23 0337)   ampicillin-sulbactam (UNASYN) 3 g vial to attach to  mL bag (0 g Intravenous Stopped 9/3/23 0530)      Independent Interpretation (X-rays, CTs, rhythm strip):  None    Assessments/Consultations/Discussion of Management or Tests:  ED Course as of 23 0620   Sun Sep 03, 2023   0239 I obtained history and examined the patient as noted above.     0425 Reassessed, patient's pain has improved    0507 Soft tissue neck CT w contrast  IMPRESSION:   1.  Findings suggestive of pharyngitis and tonsillitis, with a small abscess in the right palatine tonsil measuring 6 x 7 x 3 mm in diameter.  2.  Marked cervical adenopathy, without evidence of suppuration.  3.  The airway is patent.        0559 After repeat vitals, reassessed patient.  She states that her blood pressure always does run low and her mother says as well, she has no symptoms of lightheadedness dizziness.       Social Determinants of Health affecting care:   None    Disposition:  The patient was discharged to home.     Impression & Plan    CMS Diagnoses: None    Medical Decision Makin-year-old female without significant past medical history who presented to the emergency department with sore throat and right-sided neck swelling.  She was afebrile here denies any fevers at home.  She was overall nontoxic and stable appearing, she is tolerating oral secretions, she was not hypoxic.  She denied any difficulty breathing. She did not have any chest tenderness, no shortness of breath, no crepitus to palpation over the mediastinal area.      The CT scan obtained here showed that she had pharyngitis as well as tonsillitis and a small intra tonsillar abscess that would not be amenable to drainage given its size of less than 2 cm.  No signs of retropharyngeal abscess, Som's angina.  Floor the mouth is soft.  I gave her a  dose of steroid here and Toradol and her pain improved.  Also gave a dose of IV antibiotics here and patient was sent to with a prescription for Augmentin to be picked up.  Of note, patient's blood pressure proportion left was systolic 99/65, however he is completely asymptomatic and per review of the chart she normally is in the low 100s to 110s and she states that she and her mother both have low blood pressures I do not think that this is evident of systemic infection causing hypotension.    I discussed return precautions with the patient. She was comfortable with the plan and discharged in stable condition.     Diagnosis:    ICD-10-CM    1. Strep pharyngitis  J02.0       2. Tonsillitis  J03.90       3. Abscess, intratonsillar  J36            Discharge Medications:  Discharge Medication List as of 9/3/2023  5:58 AM        START taking these medications    Details   amoxicillin-clavulanate (AUGMENTIN) 875-125 MG tablet Take 1 tablet by mouth 2 times daily for 7 days, Disp-14 tablet, R-0, E-Prescribe                Scribe Disclosure:  IRj, am serving as a scribe at 2:25 AM on 9/3/2023 to document services personally performed by Maritza Rivera MD based on my observations and the provider's statements to me.   9/3/2023   Maritza Rivera MD Wu Klasek, Connie, MD  09/03/23 0691

## 2023-10-12 ENCOUNTER — OFFICE VISIT (OUTPATIENT)
Dept: FAMILY MEDICINE | Facility: CLINIC | Age: 33
End: 2023-10-12
Payer: COMMERCIAL

## 2023-10-12 VITALS
TEMPERATURE: 98.2 F | WEIGHT: 205 LBS | BODY MASS INDEX: 34.16 KG/M2 | HEIGHT: 65 IN | DIASTOLIC BLOOD PRESSURE: 72 MMHG | RESPIRATION RATE: 16 BRPM | SYSTOLIC BLOOD PRESSURE: 103 MMHG | HEART RATE: 100 BPM | OXYGEN SATURATION: 98 %

## 2023-10-12 DIAGNOSIS — B96.89 BACTERIAL VAGINOSIS: Primary | ICD-10-CM

## 2023-10-12 DIAGNOSIS — Z97.5 IUD (INTRAUTERINE DEVICE) IN PLACE: ICD-10-CM

## 2023-10-12 DIAGNOSIS — N76.0 BACTERIAL VAGINOSIS: Primary | ICD-10-CM

## 2023-10-12 DIAGNOSIS — N89.8 VAGINAL DISCHARGE: ICD-10-CM

## 2023-10-12 LAB
ALBUMIN UR-MCNC: NEGATIVE MG/DL
APPEARANCE UR: CLEAR
BACTERIA #/AREA URNS HPF: ABNORMAL /HPF
BILIRUB UR QL STRIP: NEGATIVE
CLUE CELLS: PRESENT
COLOR UR AUTO: YELLOW
GLUCOSE UR STRIP-MCNC: NEGATIVE MG/DL
HGB UR QL STRIP: ABNORMAL
KETONES UR STRIP-MCNC: NEGATIVE MG/DL
LEUKOCYTE ESTERASE UR QL STRIP: NEGATIVE
NITRATE UR QL: NEGATIVE
PH UR STRIP: 6 [PH] (ref 5–7)
RBC #/AREA URNS AUTO: ABNORMAL /HPF
SP GR UR STRIP: >=1.03 (ref 1–1.03)
SQUAMOUS #/AREA URNS AUTO: ABNORMAL /LPF
TRICHOMONAS, WET PREP: ABNORMAL
UROBILINOGEN UR STRIP-ACNC: 1 E.U./DL
WBC #/AREA URNS AUTO: ABNORMAL /HPF
WBC'S/HIGH POWER FIELD, WET PREP: ABNORMAL
YEAST, WET PREP: ABNORMAL

## 2023-10-12 PROCEDURE — 99213 OFFICE O/P EST LOW 20 MIN: CPT | Performed by: NURSE PRACTITIONER

## 2023-10-12 PROCEDURE — 87210 SMEAR WET MOUNT SALINE/INK: CPT | Performed by: NURSE PRACTITIONER

## 2023-10-12 PROCEDURE — 81001 URINALYSIS AUTO W/SCOPE: CPT | Performed by: NURSE PRACTITIONER

## 2023-10-12 RX ORDER — METRONIDAZOLE 500 MG/1
500 TABLET ORAL 2 TIMES DAILY
Qty: 14 TABLET | Refills: 0 | Status: SHIPPED | OUTPATIENT
Start: 2023-10-12 | End: 2023-10-19

## 2023-10-12 ASSESSMENT — ENCOUNTER SYMPTOMS
CHILLS: 0
DYSURIA: 0
ABDOMINAL PAIN: 0
FEVER: 0

## 2023-10-12 ASSESSMENT — PAIN SCALES - GENERAL: PAINLEVEL: NO PAIN (0)

## 2023-10-12 NOTE — Clinical Note
Please give pt her results (see pt instructions)   and make sure she understands to follow up for pap and further discussion.

## 2023-10-12 NOTE — PATIENT INSTRUCTIONS
Take the pills twice per day for 7 days.  No alcohol while on this medication. ollow up with me for your pap and further discussion about ongoing vaginal discharge.

## 2023-10-12 NOTE — PROGRESS NOTES
Assessment & Plan     ICD-10-CM    1. Bacterial vaginosis  N76.0 metroNIDAZOLE (FLAGYL) 500 MG tablet    B96.89       2. IUD (intrauterine device) in place  Z97.5       3. Vaginal discharge  N89.8 UA with Microscopic reflex to Culture     Wet prep - lab collect     Wet prep - lab collect     UA Microscopic with Reflex to Culture     metroNIDAZOLE (FLAGYL) 500 MG tablet            bacterial vaginosis and will treat orally.  Follow up for pap and further discussion of recurrent BV      Patient Instructions   Take the pills twice per day for 7 days.  No alcohol while on this medication. ollow up with me for your pap and further discussion about ongoing vaginal discharge.    Return in about 4 weeks (around 11/9/2023).      REGINE Ramon CNP  [facility] PADMINI Erazo is a 32 year old, presenting for the following health issues:  Establish Care and Vaginal Problem        10/12/2023     3:59 PM   Additional Questions   Roomed by Martita Colby VF       Est care.    Vaginal discharge has been going on about a year. Seems to be an excessive amount of clear dischargeand it is very constant. No color change, but odor seems different and a little more potent.  No irritation or rash.       History of Present Illness       Reason for visit:  Discharge and oder    She eats 0-1 servings of fruits and vegetables daily.She consumes 4 sweetened beverage(s) daily.She exercises with enough effort to increase her heart rate 9 or less minutes per day.  She exercises with enough effort to increase her heart rate 3 or less days per week.   She is taking medications regularly.           Review of Systems   Constitutional:  Negative for chills and fever.   Gastrointestinal:  Negative for abdominal pain.   Genitourinary:  Positive for dyspareunia and vaginal discharge. Negative for dysuria, genital sores, pelvic pain and vaginal pain.   Psychiatric/Behavioral:  Negative for mood changes.            "  Objective    /72 (BP Location: Right arm, Patient Position: Sitting, Cuff Size: Adult Large)   Pulse 100   Temp 98.2  F (36.8  C) (Oral)   Resp 16   Ht 1.651 m (5' 5\")   Wt 93 kg (205 lb)   LMP  (LMP Unknown)   SpO2 98%   BMI 34.11 kg/m    Body mass index is 34.11 kg/m .  Physical Exam  Constitutional:       General: She is not in acute distress.     Appearance: Normal appearance. She is well-developed.   Pulmonary:      Effort: Pulmonary effort is normal.   Skin:     Findings: No rash.   Neurological:      Mental Status: She is alert.   Psychiatric:         Mood and Affect: Mood normal.         Judgment: Judgment normal.        Wet Prep positive for clue cells  UA negative for infection          "

## 2023-10-16 ENCOUNTER — TELEPHONE (OUTPATIENT)
Dept: FAMILY MEDICINE | Facility: CLINIC | Age: 33
End: 2023-10-16
Payer: COMMERCIAL

## 2023-10-16 NOTE — TELEPHONE ENCOUNTER
Called patient to discuss diagnosis and medication regarding bacterial vaginosis from 10/12/23 visit per Carly Fields's note on 10/13/23. Voicemail box is full.    Michelle Ho RN on 10/16/2023 at 9:42 AM

## 2024-06-09 ENCOUNTER — HEALTH MAINTENANCE LETTER (OUTPATIENT)
Age: 34
End: 2024-06-09

## 2024-07-31 ENCOUNTER — APPOINTMENT (OUTPATIENT)
Dept: MRI IMAGING | Facility: CLINIC | Age: 34
End: 2024-07-31
Attending: EMERGENCY MEDICINE
Payer: COMMERCIAL

## 2024-07-31 ENCOUNTER — HOSPITAL ENCOUNTER (EMERGENCY)
Facility: CLINIC | Age: 34
Discharge: HOME | End: 2024-07-31
Attending: EMERGENCY MEDICINE | Admitting: EMERGENCY MEDICINE
Payer: COMMERCIAL

## 2024-07-31 VITALS
DIASTOLIC BLOOD PRESSURE: 89 MMHG | TEMPERATURE: 97 F | OXYGEN SATURATION: 100 % | HEIGHT: 65 IN | WEIGHT: 205.47 LBS | RESPIRATION RATE: 16 BRPM | HEART RATE: 88 BPM | BODY MASS INDEX: 34.23 KG/M2 | SYSTOLIC BLOOD PRESSURE: 109 MMHG

## 2024-07-31 DIAGNOSIS — M79.601 RIGHT ARM PAIN: ICD-10-CM

## 2024-07-31 DIAGNOSIS — R47.9 SPEECH DISTURBANCE, UNSPECIFIED TYPE: ICD-10-CM

## 2024-07-31 DIAGNOSIS — R29.898 RIGHT ARM WEAKNESS: ICD-10-CM

## 2024-07-31 DIAGNOSIS — R26.81 UNSTEADY GAIT: ICD-10-CM

## 2024-07-31 LAB
ANION GAP SERPL CALCULATED.3IONS-SCNC: 13 MMOL/L (ref 7–15)
BASOPHILS # BLD AUTO: 0 10E3/UL (ref 0–0.2)
BASOPHILS NFR BLD AUTO: 1 %
BUN SERPL-MCNC: 11.1 MG/DL (ref 6–20)
CALCIUM SERPL-MCNC: 9.3 MG/DL (ref 8.8–10.4)
CHLORIDE SERPL-SCNC: 106 MMOL/L (ref 98–107)
CREAT SERPL-MCNC: 0.57 MG/DL (ref 0.51–0.95)
EGFRCR SERPLBLD CKD-EPI 2021: >90 ML/MIN/1.73M2
EOSINOPHIL # BLD AUTO: 0.1 10E3/UL (ref 0–0.7)
EOSINOPHIL NFR BLD AUTO: 1 %
ERYTHROCYTE [DISTWIDTH] IN BLOOD BY AUTOMATED COUNT: 12.7 % (ref 10–15)
GLUCOSE SERPL-MCNC: 82 MG/DL (ref 70–99)
HCG SERPL QL: NEGATIVE
HCO3 SERPL-SCNC: 21 MMOL/L (ref 22–29)
HCT VFR BLD AUTO: 41.6 % (ref 35–47)
HGB BLD-MCNC: 13.9 G/DL (ref 11.7–15.7)
HOLD SPECIMEN: NORMAL
HOLD SPECIMEN: NORMAL
IMM GRANULOCYTES # BLD: 0 10E3/UL
IMM GRANULOCYTES NFR BLD: 0 %
LYMPHOCYTES # BLD AUTO: 1.9 10E3/UL (ref 0.8–5.3)
LYMPHOCYTES NFR BLD AUTO: 22 %
MCH RBC QN AUTO: 28.5 PG (ref 26.5–33)
MCHC RBC AUTO-ENTMCNC: 33.4 G/DL (ref 31.5–36.5)
MCV RBC AUTO: 85 FL (ref 78–100)
MONOCYTES # BLD AUTO: 0.5 10E3/UL (ref 0–1.3)
MONOCYTES NFR BLD AUTO: 5 %
NEUTROPHILS # BLD AUTO: 6.2 10E3/UL (ref 1.6–8.3)
NEUTROPHILS NFR BLD AUTO: 71 %
NRBC # BLD AUTO: 0 10E3/UL
NRBC BLD AUTO-RTO: 0 /100
PLATELET # BLD AUTO: 353 10E3/UL (ref 150–450)
POTASSIUM SERPL-SCNC: 4.1 MMOL/L (ref 3.4–5.3)
RBC # BLD AUTO: 4.87 10E6/UL (ref 3.8–5.2)
SODIUM SERPL-SCNC: 140 MMOL/L (ref 135–145)
WBC # BLD AUTO: 8.7 10E3/UL (ref 4–11)

## 2024-07-31 PROCEDURE — 84703 CHORIONIC GONADOTROPIN ASSAY: CPT | Performed by: EMERGENCY MEDICINE

## 2024-07-31 PROCEDURE — 99285 EMERGENCY DEPT VISIT HI MDM: CPT | Mod: 25

## 2024-07-31 PROCEDURE — A9585 GADOBUTROL INJECTION: HCPCS | Performed by: EMERGENCY MEDICINE

## 2024-07-31 PROCEDURE — 85041 AUTOMATED RBC COUNT: CPT | Performed by: EMERGENCY MEDICINE

## 2024-07-31 PROCEDURE — 70553 MRI BRAIN STEM W/O & W/DYE: CPT

## 2024-07-31 PROCEDURE — 255N000002 HC RX 255 OP 636: Performed by: EMERGENCY MEDICINE

## 2024-07-31 PROCEDURE — 36415 COLL VENOUS BLD VENIPUNCTURE: CPT | Performed by: EMERGENCY MEDICINE

## 2024-07-31 PROCEDURE — 82374 ASSAY BLOOD CARBON DIOXIDE: CPT | Performed by: EMERGENCY MEDICINE

## 2024-07-31 RX ORDER — GADOBUTROL 604.72 MG/ML
9 INJECTION INTRAVENOUS ONCE
Status: COMPLETED | OUTPATIENT
Start: 2024-07-31 | End: 2024-07-31

## 2024-07-31 RX ADMIN — GADOBUTROL 9 ML: 604.72 INJECTION INTRAVENOUS at 14:09

## 2024-07-31 ASSESSMENT — ACTIVITIES OF DAILY LIVING (ADL)
ADLS_ACUITY_SCORE: 35

## 2024-07-31 ASSESSMENT — COLUMBIA-SUICIDE SEVERITY RATING SCALE - C-SSRS
1. IN THE PAST MONTH, HAVE YOU WISHED YOU WERE DEAD OR WISHED YOU COULD GO TO SLEEP AND NOT WAKE UP?: NO
2. HAVE YOU ACTUALLY HAD ANY THOUGHTS OF KILLING YOURSELF IN THE PAST MONTH?: NO
6. HAVE YOU EVER DONE ANYTHING, STARTED TO DO ANYTHING, OR PREPARED TO DO ANYTHING TO END YOUR LIFE?: NO

## 2024-07-31 NOTE — ED TRIAGE NOTES
Pt states she has been having right sided weakness, slurred speech and pain in her right arm for about a month.  Pt states she has family hx of strokes.  She has a burn on her left hand from taking hot water out of a microwave when her right hand lost control and the water burned her left hand.     Triage Assessment (Adult)       Row Name 07/31/24 1224          Triage Assessment    Airway WDL WDL        Respiratory WDL    Respiratory WDL WDL        Cardiac WDL    Cardiac WDL WDL

## 2024-07-31 NOTE — ED PROVIDER NOTES
"  Emergency Department Note      History of Present Illness     Chief Complaint   Stroke Symptoms      HPI   Castro Kurtz is a 33 year old female who presets with left hand burn and right sided weakness. Pt feels that her speech and RUE strength have been giving her issues for the past month. She also has some pain in the right arm too. She also burned her left hand 2 days ago on hot water which is causing pain. Over the past few weeks patient has noticed some balance issues with walking. NO right leg weakness. Pt has been having headaches, but this is not new to her. No fever. 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       Past Medical History     Medical History and Problem List   IUD    Medications   Metronidazole     Surgical History   Mirena     Physical Exam     Patient Vitals for the past 24 hrs:   BP Temp Temp src Pulse Resp SpO2 Height Weight   07/31/24 1500 109/89 -- -- 88 16 100 % -- --   07/31/24 1225 109/70 97  F (36.1  C) Temporal 74 16 100 % 1.651 m (5' 5\") 93.2 kg (205 lb 7.5 oz)     Physical Exam  VS: Reviewed per above  HENT: Mucous membranes moist, no nuchal rigidity  EYES: sclera anicteric  CV: Rate as noted,  regular rhythm.   RESP: Effort normal. Breath sounds are normal bilaterally.  NEURO: GCS 15, cranial nerves II through XII are intact, 5 out of 5 strength in all 4 extremities, sensation is intact light touch in all 4 extremities  MSK: No deformity of the extremities. Intact right radial pulse at the wrist.  SKIN: Warm and dry, evidence of left dorsal hand partial-thickness burn with sloughing of superficial layer of skin.      Diagnostics     Lab Results   Labs Ordered and Resulted from Time of ED Arrival to Time of ED Departure   BASIC METABOLIC PANEL - Abnormal       Result Value    Sodium 140      Potassium 4.1      Chloride 106      Carbon Dioxide (CO2) 21 (*)     Anion Gap 13      Urea Nitrogen 11.1      Creatinine 0.57      GFR Estimate " >90      Calcium 9.3      Glucose 82     HCG QUALITATIVE PREGNANCY - Normal    hCG Serum Qualitative Negative     CBC WITH PLATELETS AND DIFFERENTIAL    WBC Count 8.7      RBC Count 4.87      Hemoglobin 13.9      Hematocrit 41.6      MCV 85      MCH 28.5      MCHC 33.4      RDW 12.7      Platelet Count 353      % Neutrophils 71      % Lymphocytes 22      % Monocytes 5      % Eosinophils 1      % Basophils 1      % Immature Granulocytes 0      NRBCs per 100 WBC 0      Absolute Neutrophils 6.2      Absolute Lymphocytes 1.9      Absolute Monocytes 0.5      Absolute Eosinophils 0.1      Absolute Basophils 0.0      Absolute Immature Granulocytes 0.0      Absolute NRBCs 0.0         Imaging   MR Brain w/o & w Contrast   Final Result   Impression: Essentially normal brain MR with contrast.      TERRY JIMENEZ MD            SYSTEM ID:  [id number]        Independent Interpretation   None    ED Course      Medications Administered   Medications   gadobutrol (GADAVIST) injection 9 mL (9 mLs Intravenous $Given 7/31/24 7028)       ED Course        Optional/Additional Documentation  None    Medical Decision Making / Diagnosis     CMS Diagnoses: None    MIPS       None    MDM   Castro Kurtz is a 33 year old female who presents to the ER for evaluation of subjective speech disturbance, right arm weakness and discomfort and feeling as though she is having trouble walking over the past few weeks.  Vital signs are reassuring.  On exam I do not appreciate signs of limb ischemia or asymmetry of the right upper extremity to suggest DVT.  Objectively I do not appreciate neurologic deficits but given constellation of symptoms, an MRI of the brain was pursued which fortunately did not show pathology to explain her constellation of symptoms.  Encouraged ongoing neurology follow-up as an outpatient.  Discuss further.  Return precautions discussed.    Disposition   The patient was discharged.     Diagnosis     ICD-10-CM    1. Right arm  weakness  R29.898     subjective      2. Right arm pain  M79.601       3. Speech disturbance, unspecified type  R47.9       4. Unsteady gait  R26.81     subjective           Discharge Medications   There are no discharge medications for this patient.        Scribe Disclosure:  I, Liang Hill, am serving as a scribe at 1:45 PM on 7/31/2024 to document services personally performed by Godwin Chicas MD based on my observations and the provider's statements to me.        Godwin Chicas MD  07/31/24 6786

## 2024-07-31 NOTE — DISCHARGE INSTRUCTIONS
You came to the ER for speech concerns, right arm discomfort and weakness concerns, concerns regarding her gait.  The MRI of your brain was reassuring.  If your symptoms change or worsen, please return to the ER.  Otherwise follow-up with neurology to discuss your symptoms further.

## 2024-09-23 ENCOUNTER — OFFICE VISIT (OUTPATIENT)
Dept: PEDIATRICS | Facility: CLINIC | Age: 34
End: 2024-09-23
Payer: COMMERCIAL

## 2024-09-23 VITALS
OXYGEN SATURATION: 98 % | WEIGHT: 202 LBS | TEMPERATURE: 97.6 F | DIASTOLIC BLOOD PRESSURE: 67 MMHG | HEART RATE: 76 BPM | BODY MASS INDEX: 33.66 KG/M2 | RESPIRATION RATE: 16 BRPM | SYSTOLIC BLOOD PRESSURE: 96 MMHG | HEIGHT: 65 IN

## 2024-09-23 DIAGNOSIS — N89.8 VAGINAL DISCHARGE: ICD-10-CM

## 2024-09-23 DIAGNOSIS — B96.89 BACTERIAL VAGINOSIS: ICD-10-CM

## 2024-09-23 DIAGNOSIS — R29.90 STROKE-LIKE SYMPTOMS: Primary | ICD-10-CM

## 2024-09-23 DIAGNOSIS — N76.0 BACTERIAL VAGINOSIS: ICD-10-CM

## 2024-09-23 LAB
CLUE CELLS: PRESENT
TRICHOMONAS, WET PREP: ABNORMAL
WBC'S/HIGH POWER FIELD, WET PREP: ABNORMAL
YEAST, WET PREP: ABNORMAL

## 2024-09-23 PROCEDURE — 99214 OFFICE O/P EST MOD 30 MIN: CPT | Performed by: NURSE PRACTITIONER

## 2024-09-23 PROCEDURE — 87210 SMEAR WET MOUNT SALINE/INK: CPT | Performed by: NURSE PRACTITIONER

## 2024-09-23 PROCEDURE — G2211 COMPLEX E/M VISIT ADD ON: HCPCS | Performed by: NURSE PRACTITIONER

## 2024-09-23 RX ORDER — METRONIDAZOLE 7.5 MG/G
1 GEL VAGINAL AT BEDTIME
Qty: 25 G | Refills: 0 | Status: SHIPPED | OUTPATIENT
Start: 2024-09-23 | End: 2024-09-28

## 2024-09-23 ASSESSMENT — PAIN SCALES - GENERAL: PAINLEVEL: NO PAIN (0)

## 2024-09-23 NOTE — PROGRESS NOTES
Assessment & Plan     Stroke-like symptoms  Her stroke symptoms are subjective and have been present without change for 6 months. Objectively, her neurological exam is completely unremarkable with exception of imbalance with heel walking and toe walking. I don't feel her symptoms warrant ED evaluation at this time, however I do recommend priority referral to neurology for evaluation. Her risk factors for stroke include obesity and current everyday smoker. She was given strict instructions to present to the ED immediately should her symptoms change, worsen, or new symptoms develop.   - Adult Neurology  Referral; Future    Vaginal discharge  Bacterial vaginosis  Wet prep positive for BV, will treat with metronidazole vaginal gel per patient preference. Advised to avoid intercourse during treatment.   - Wet prep - Clinic Collect  - metroNIDAZOLE (METROGEL) 0.75 % vaginal gel         MED REC REQUIRED  Post Medication Reconciliation Status:     Nicotine/Tobacco Cessation  She reports that she has been smoking cigarettes. She has a 1.3 pack-year smoking history. She has never used smokeless tobacco.  Nicotine/Tobacco Cessation Plan  Not discussed.    The longitudinal plan of care for the diagnosis(es)/condition(s) as documented were addressed during this visit. Due to the added complexity in care, I will continue to support Castro in the subsequent management and with ongoing continuity of care.    Cesar Erazo is a 33 year old, presenting for the following health issues:  ER F/U      9/23/2024    11:04 AM   Additional Questions   Roomed by maximino   Accompanied by waqas         9/23/2024    11:04 AM   Patient Reported Additional Medications   Patient reports taking the following new medications waqas     HPI       ED/UC Followup:    Facility:  [facility] ED  Date of visit: 7/31/2024  Reason for visit: STROKE SYMPTOMS   Current Status: STILL EXPERIENCING SLURRED SPEECH, CANNOT WALK STRAIGHT AND TROUBLE USING  "RIGHT HAND DUE TO WEAKNESS      Presents for ongoing stroke-like symptoms. Her symptoms have been present for 6 months and have remained unchanged. She presented to the ED 7/31/24 after burning her left hand and was complaining of right sided weakness. Was waking a hot dish out of the microwave and due to the right handed weakness, she spilled the contents on her left hand resulting in a burn. She also endorsed issues with balance and walking. A brain MRI was completed and was unremarkable. She was instructed to follow-up with neurology but never did so.     Her current symptoms include subjective report of slurred speech, difficulty walking straight, leans and tips over easily which has resulted in a couple falls, none of which did she endure injury or hit her head. She also notes that when she writes it looks like a child is writing. She is right hand dominant. She notices her symptoms less when she is laying down not doing anything. She assures me her symptoms have been completely unchanged since her ED visit in July.     She is a stay at home mom. Has three kids ages 18, 13, 4.    Maternal aunt with stroke. Otherwise family history is negative for neurological diseases.   She takes no daily medications.   Smokes 2-3 cigarettes daily.   Alcohol use: 3 drinks/week    Reports chronic vaginal discharge and odor. Has been present since last October when she was treated for bacterial vaginosis.   She is monogamous with one partner.           Objective    BP 96/67   Pulse 76   Temp 97.6  F (36.4  C) (Temporal)   Resp 16   Ht 1.651 m (5' 5\")   Wt 91.6 kg (202 lb)   SpO2 98%   BMI 33.61 kg/m    Body mass index is 33.61 kg/m .  Physical Exam  Constitutional:       General: She is not in acute distress.     Appearance: Normal appearance. She is not ill-appearing or toxic-appearing.   HENT:      Mouth/Throat:      Mouth: Mucous membranes are moist.      Pharynx: Oropharynx is clear.   Eyes:      Extraocular " Movements: Extraocular movements intact.      Conjunctiva/sclera: Conjunctivae normal.      Pupils: Pupils are equal, round, and reactive to light.   Cardiovascular:      Rate and Rhythm: Normal rate.   Pulmonary:      Effort: Pulmonary effort is normal. No respiratory distress.   Skin:     General: Skin is warm and dry.   Neurological:      General: No focal deficit present.      Mental Status: She is alert and oriented to person, place, and time.      Cranial Nerves: No cranial nerve deficit.      Motor: No weakness.      Coordination: Coordination normal.      Gait: Gait normal.      Comments: Negative romberg.   Has some trouble with balance when walking on her toes and on her heels. Otherwise gait is normal.    Psychiatric:         Mood and Affect: Mood normal.         Behavior: Behavior normal.                  Signed Electronically by: REGINE Guerra CNP

## 2024-10-21 ENCOUNTER — MYC MEDICAL ADVICE (OUTPATIENT)
Dept: INTERNAL MEDICINE | Facility: CLINIC | Age: 34
End: 2024-10-21
Payer: COMMERCIAL

## 2024-11-07 ENCOUNTER — LAB (OUTPATIENT)
Dept: LAB | Facility: CLINIC | Age: 34
End: 2024-11-07
Payer: COMMERCIAL

## 2024-11-07 DIAGNOSIS — G25.81 RESTLESS LEGS SYNDROME (RLS): Primary | ICD-10-CM

## 2024-11-07 LAB
FERRITIN SERPL-MCNC: 123 NG/ML (ref 6–175)
IRON BINDING CAPACITY (ROCHE): 252 UG/DL (ref 240–430)
IRON SATN MFR SERPL: 18 % (ref 15–46)
IRON SERPL-MCNC: 46 UG/DL (ref 37–145)

## 2024-11-07 PROCEDURE — 82728 ASSAY OF FERRITIN: CPT

## 2024-11-07 PROCEDURE — 83550 IRON BINDING TEST: CPT

## 2024-11-07 PROCEDURE — 36415 COLL VENOUS BLD VENIPUNCTURE: CPT

## 2025-06-15 ENCOUNTER — HEALTH MAINTENANCE LETTER (OUTPATIENT)
Age: 35
End: 2025-06-15